# Patient Record
Sex: MALE | URBAN - METROPOLITAN AREA
[De-identification: names, ages, dates, MRNs, and addresses within clinical notes are randomized per-mention and may not be internally consistent; named-entity substitution may affect disease eponyms.]

---

## 2017-01-28 ENCOUNTER — NURSE TRIAGE (OUTPATIENT)
Dept: ADMINISTRATIVE | Facility: CLINIC | Age: 67
End: 2017-01-28

## 2017-01-28 NOTE — TELEPHONE ENCOUNTER
Reason for Disposition   Cold with no complications  (all triage questions negative)    Protocols used:  COLDS-A-    Patient reporting 2 days ago he started with nasal congestion and yesterday had facial pain between his eyebrows. He states today the congestion is less and the pain is gone. The nasal discharge when he blows his nose is green. He is wondering if he should be started on an antibiotic. He denies fever. Advised on home care as documented including humidifier, nasal rinses. Advised if nasal discharge lasts >10 days or if he develops any worsening symptoms to call back. George has no further questions. Please contact caller with any further care advice.